# Patient Record
Sex: FEMALE | Race: OTHER | HISPANIC OR LATINO
[De-identification: names, ages, dates, MRNs, and addresses within clinical notes are randomized per-mention and may not be internally consistent; named-entity substitution may affect disease eponyms.]

---

## 2019-02-25 ENCOUNTER — APPOINTMENT (OUTPATIENT)
Dept: CT IMAGING | Facility: HOSPITAL | Age: 49
End: 2019-02-25
Payer: COMMERCIAL

## 2019-02-25 ENCOUNTER — OUTPATIENT (OUTPATIENT)
Dept: OUTPATIENT SERVICES | Facility: HOSPITAL | Age: 49
LOS: 1 days | End: 2019-02-25
Payer: COMMERCIAL

## 2019-02-25 PROCEDURE — 75571 CT HRT W/O DYE W/CA TEST: CPT

## 2019-02-25 PROCEDURE — 75571 CT HRT W/O DYE W/CA TEST: CPT | Mod: 26

## 2019-03-28 ENCOUNTER — APPOINTMENT (OUTPATIENT)
Dept: MRI IMAGING | Facility: CLINIC | Age: 49
End: 2019-03-28
Payer: COMMERCIAL

## 2019-03-28 ENCOUNTER — OUTPATIENT (OUTPATIENT)
Dept: OUTPATIENT SERVICES | Facility: HOSPITAL | Age: 49
LOS: 1 days | End: 2019-03-28

## 2019-03-28 PROCEDURE — 71552 MRI CHEST W/O & W/DYE: CPT | Mod: 26

## 2019-05-09 ENCOUNTER — LABORATORY RESULT (OUTPATIENT)
Age: 49
End: 2019-05-09

## 2019-05-09 ENCOUNTER — APPOINTMENT (OUTPATIENT)
Dept: THORACIC SURGERY | Facility: CLINIC | Age: 49
End: 2019-05-09
Payer: COMMERCIAL

## 2019-05-09 VITALS
WEIGHT: 152 LBS | OXYGEN SATURATION: 99 % | HEART RATE: 70 BPM | HEIGHT: 66 IN | SYSTOLIC BLOOD PRESSURE: 135 MMHG | BODY MASS INDEX: 24.43 KG/M2 | RESPIRATION RATE: 15 BRPM | DIASTOLIC BLOOD PRESSURE: 90 MMHG

## 2019-05-09 DIAGNOSIS — J98.59 OTHER DISEASES OF MEDIASTINUM, NOT ELSEWHERE CLASSIFIED: ICD-10-CM

## 2019-05-09 DIAGNOSIS — E32.0 PERSISTENT HYPERPLASIA OF THYMUS: ICD-10-CM

## 2019-05-09 DIAGNOSIS — Z86.39 PERSONAL HISTORY OF OTHER ENDOCRINE, NUTRITIONAL AND METABOLIC DISEASE: ICD-10-CM

## 2019-05-09 PROCEDURE — 99204 OFFICE O/P NEW MOD 45 MIN: CPT

## 2019-05-09 NOTE — PHYSICAL EXAM
[General Appearance - Alert] : alert [General Appearance - In No Acute Distress] : in no acute distress [Sclera] : the sclera and conjunctiva were normal [PERRL With Normal Accommodation] : pupils were equal in size, round, and reactive to light [Outer Ear] : the ears and nose were normal in appearance [Both Tympanic Membranes Were Examined] : both tympanic membranes were normal [Neck Appearance] : the appearance of the neck was normal [Neck Cervical Mass (___cm)] : no neck mass was observed [Respiration, Rhythm And Depth] : normal respiratory rhythm and effort [Apical Impulse] : the apical impulse was normal [Heart Rate And Rhythm] : heart rate was normal and rhythm regular [Examination Of The Chest] : the chest was normal in appearance [Heart Sounds] : normal S1 and S2 [2+] : right 2+ [Bowel Sounds] : normal bowel sounds [Musculoskeletal - Swelling] : no joint swelling seen [Abnormal Walk] : normal gait [Abdomen Soft] : soft [Skin Color & Pigmentation] : normal skin color and pigmentation [Skin Turgor] : normal skin turgor [Oriented To Time, Place, And Person] : oriented to person, place, and time [] : no rash [Impaired Insight] : insight and judgment were intact

## 2019-05-10 PROBLEM — J98.59 MEDIASTINAL MASS: Status: ACTIVE | Noted: 2019-05-06

## 2019-05-10 PROBLEM — E32.0 THYMUS HYPERPLASIA: Status: ACTIVE | Noted: 2019-05-10

## 2019-05-10 NOTE — HISTORY OF PRESENT ILLNESS
[FreeTextEntry1] : 48 year old female, never smoker, with a PMHX of sudden onset sensorineural hearing loss, with recent  MRI of chest revealing anterior mediastinal mass incidentally found on previous CT chest during cardiac workup. She presents today an initial surgical evaluation. She is referred by self referred/ Dr. Amanda Bhat.\par \par Today she reports feeling well. She does report a chronic dry cough. She denies fever, chills and SOB. \par \par CT chest/ coronary calcium scoring  completed on 2/26/19:\par - 2.4 x 1.3 cm soft tissue structure in the anterior mediastinum \par - 4.2 cm aortic root aneurysm\par - calcium score is at zero Agatston units\par - absence of aortic calcification\par \par MRI CHEST [facility] IC on 3/28/19:\par - re demonstration of 4.9 x 4.8 x 1.0 cm mass within the anterior mediastinum\par - chemical shift artifact is noted as evidence by loss of signal on out of phase images \par - remonstration of aneurysmal dilatation of the aortic root measuring up to 4.0 cm

## 2019-05-10 NOTE — ADDENDUM
[FreeTextEntry1] : All medical record entries made by the Elviaibalyse were at BENEDICTO sibley RICHARD  direction and personally dictated by me on 05/09/2019 . I have reviewed the chart and agree that the record accurately reflects my personal performance of the history, physical exam, assessment and plan. I have also personally directed, reviewed, and agreed with the chart.

## 2019-05-10 NOTE — ASSESSMENT
[FreeTextEntry1] : 48 year old female, never smoker, with recent  MRI of chest revealing anterior mediastinal mass incidentally found on previous CT chest during cardiac workup. She presents today for an initial surgical evaluation. \par \par Today she reports feeling well. She does report a chronic dry cough. She denies fever, chills and SOB. 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 We reviewed operative and nonoperative treatment, including VATS thymectomy versus observation. I discussed that we obtain a full biochemical workup (Thymoma Panel) . When results are available, we will discuss further. \par \par We will also present her to the thoracic tumor board for discussion. If the tumor board is in agreement and patient agrees with plan of care, we will proceed with observation and repeat MRI in 6 months. If patient is concerned, biochemical workup is not negative, or if the tumor board consensus is that we cannot r/o thymoma ( mass vs. hyperplasia) , will proceed with Bronchoscopy, RIGHT VATS robotic assisted possible bilateral VATS thymectomy with lymph node dissection  The risks, benefits, convalescence and alternatives were reviewed. \par \par The patient was counseled on the risks, benefits and alternatives of proceeding with thymectomy. Specifically, the risk of bleeding, need for a blood transfusion, DVT, pulmonary embolism, pneumonia, postoperative respiratory insufficiency, phrenic nerve injury, postoperative ventilator support, as well as prolonged air leak, need for chest drainage, dysrhythmia, myocardial infarction, postoperative pain syndrome, need for adjuvant therapy, risk of recurrence, need for surveillance, conversion to an open procedure, as well as mortality was discussed with the patient who understands and agrees to the above. \par \par I have reviewed the patient's medical record and diagnostic images at the time of this office visit and have made the following recommendations\par \par Plan:\par 1. Patient sent for Thymoma Panel\par 2. Present to Thoracic Tumor Board 5/16/19 and contact patient with consensus